# Patient Record
Sex: MALE | Race: OTHER | HISPANIC OR LATINO | URBAN - METROPOLITAN AREA
[De-identification: names, ages, dates, MRNs, and addresses within clinical notes are randomized per-mention and may not be internally consistent; named-entity substitution may affect disease eponyms.]

---

## 2022-06-11 ENCOUNTER — EMERGENCY (EMERGENCY)
Facility: HOSPITAL | Age: 47
LOS: 1 days | Discharge: HOME | End: 2022-06-11
Attending: EMERGENCY MEDICINE | Admitting: EMERGENCY MEDICINE
Payer: COMMERCIAL

## 2022-06-11 VITALS
TEMPERATURE: 98 F | SYSTOLIC BLOOD PRESSURE: 153 MMHG | OXYGEN SATURATION: 100 % | DIASTOLIC BLOOD PRESSURE: 87 MMHG | RESPIRATION RATE: 18 BRPM | HEART RATE: 81 BPM

## 2022-06-11 LAB
ALBUMIN SERPL ELPH-MCNC: 4.4 G/DL — SIGNIFICANT CHANGE UP (ref 3.3–5)
ALP SERPL-CCNC: 73 U/L — SIGNIFICANT CHANGE UP (ref 40–120)
ALT FLD-CCNC: 40 U/L — SIGNIFICANT CHANGE UP (ref 4–41)
ANION GAP SERPL CALC-SCNC: 10 MMOL/L — SIGNIFICANT CHANGE UP (ref 7–14)
AST SERPL-CCNC: 28 U/L — SIGNIFICANT CHANGE UP (ref 4–40)
BASOPHILS # BLD AUTO: 0.02 K/UL — SIGNIFICANT CHANGE UP (ref 0–0.2)
BASOPHILS NFR BLD AUTO: 0.3 % — SIGNIFICANT CHANGE UP (ref 0–2)
BILIRUB SERPL-MCNC: 0.4 MG/DL — SIGNIFICANT CHANGE UP (ref 0.2–1.2)
BUN SERPL-MCNC: 20 MG/DL — SIGNIFICANT CHANGE UP (ref 7–23)
CALCIUM SERPL-MCNC: 9.3 MG/DL — SIGNIFICANT CHANGE UP (ref 8.4–10.5)
CHLORIDE SERPL-SCNC: 107 MMOL/L — SIGNIFICANT CHANGE UP (ref 98–107)
CO2 SERPL-SCNC: 24 MMOL/L — SIGNIFICANT CHANGE UP (ref 22–31)
CREAT SERPL-MCNC: 1.13 MG/DL — SIGNIFICANT CHANGE UP (ref 0.5–1.3)
EGFR: 81 ML/MIN/1.73M2 — SIGNIFICANT CHANGE UP
EOSINOPHIL # BLD AUTO: 0.04 K/UL — SIGNIFICANT CHANGE UP (ref 0–0.5)
EOSINOPHIL NFR BLD AUTO: 0.6 % — SIGNIFICANT CHANGE UP (ref 0–6)
GLUCOSE SERPL-MCNC: 96 MG/DL — SIGNIFICANT CHANGE UP (ref 70–99)
HCT VFR BLD CALC: 47.8 % — SIGNIFICANT CHANGE UP (ref 39–50)
HGB BLD-MCNC: 15.8 G/DL — SIGNIFICANT CHANGE UP (ref 13–17)
IANC: 4.51 K/UL — SIGNIFICANT CHANGE UP (ref 1.8–7.4)
IMM GRANULOCYTES NFR BLD AUTO: 0.1 % — SIGNIFICANT CHANGE UP (ref 0–1.5)
LYMPHOCYTES # BLD AUTO: 1.79 K/UL — SIGNIFICANT CHANGE UP (ref 1–3.3)
LYMPHOCYTES # BLD AUTO: 25.6 % — SIGNIFICANT CHANGE UP (ref 13–44)
MCHC RBC-ENTMCNC: 29.3 PG — SIGNIFICANT CHANGE UP (ref 27–34)
MCHC RBC-ENTMCNC: 33.1 GM/DL — SIGNIFICANT CHANGE UP (ref 32–36)
MCV RBC AUTO: 88.5 FL — SIGNIFICANT CHANGE UP (ref 80–100)
MONOCYTES # BLD AUTO: 0.62 K/UL — SIGNIFICANT CHANGE UP (ref 0–0.9)
MONOCYTES NFR BLD AUTO: 8.9 % — SIGNIFICANT CHANGE UP (ref 2–14)
NEUTROPHILS # BLD AUTO: 4.51 K/UL — SIGNIFICANT CHANGE UP (ref 1.8–7.4)
NEUTROPHILS NFR BLD AUTO: 64.5 % — SIGNIFICANT CHANGE UP (ref 43–77)
NRBC # BLD: 0 /100 WBCS — SIGNIFICANT CHANGE UP
NRBC # FLD: 0 K/UL — SIGNIFICANT CHANGE UP
OB PNL STL: POSITIVE
PLATELET # BLD AUTO: 254 K/UL — SIGNIFICANT CHANGE UP (ref 150–400)
POTASSIUM SERPL-MCNC: 4.2 MMOL/L — SIGNIFICANT CHANGE UP (ref 3.5–5.3)
POTASSIUM SERPL-SCNC: 4.2 MMOL/L — SIGNIFICANT CHANGE UP (ref 3.5–5.3)
PROT SERPL-MCNC: 7 G/DL — SIGNIFICANT CHANGE UP (ref 6–8.3)
RBC # BLD: 5.4 M/UL — SIGNIFICANT CHANGE UP (ref 4.2–5.8)
RBC # FLD: 12.7 % — SIGNIFICANT CHANGE UP (ref 10.3–14.5)
SODIUM SERPL-SCNC: 141 MMOL/L — SIGNIFICANT CHANGE UP (ref 135–145)
WBC # BLD: 6.99 K/UL — SIGNIFICANT CHANGE UP (ref 3.8–10.5)
WBC # FLD AUTO: 6.99 K/UL — SIGNIFICANT CHANGE UP (ref 3.8–10.5)

## 2022-06-11 PROCEDURE — 99284 EMERGENCY DEPT VISIT MOD MDM: CPT

## 2022-06-11 NOTE — ED PROVIDER NOTE - PATIENT PORTAL LINK FT
You can access the FollowMyHealth Patient Portal offered by [facility] by registering at the following website: [email or web address]. By joining [email or web address]’s FollowMyHealth portal, you will also be able to view your health information using other applications (apps) compatible with our system.

## 2022-06-11 NOTE — ED PROVIDER NOTE - OBJECTIVE STATEMENT
This is a 46 yr old M, no pertinent pmh with c/o rectal bleeding since the last 2 days. Reports significant blood in bm. Since them he feels weak. This is a 46 yr old M, no pertinent pmh with c/o rectal bleeding since the last 2 days. Reports significant blood in bm. Since them he feels weak.  Family hx of colon ca, father age 56 passed away. This is a 46 yr old M, no pertinent pmh with c/o rectal bleeding since the last 2 days. Reports significant blood in bm. Since them he feels weak. C/o blood in the semen, as per his pmd possible bph, denies sexual disfunction.   Family hx of colon ca, father age 56 passed away. Denies fever, chills, sob, abd pain and cramping, changes in bladder and bowel habits.

## 2022-06-11 NOTE — ED ADULT TRIAGE NOTE - CHIEF COMPLAINT QUOTE
Pt reporting to the ED for bright red rectal bleeding starting 2 days ago. Reports bleeding when having BM. No chest pain, SOB, light headed. No pmh

## 2022-06-11 NOTE — ED PROVIDER NOTE - NSFOLLOWUPINSTRUCTIONS_ED_ALL_ED_FT
Gastrointestinal Bleeding      Gastrointestinal (GI) bleeding is bleeding somewhere along the digestive tract, between the mouth and the anus. This tract includes the mouth, esophagus, stomach, small intestine, large intestine, and anus. The large intestine is often called the colon.    GI bleeding can be caused by various problems. The severity of these problems can range from mild to serious or even life-threatening. If you have GI bleeding, you may find blood in your stools (feces), you may have black stools, or you may vomit blood. If there is a lot of bleeding, you may need to stay in the hospital.      What are the causes?    This condition may be caused by:  •Inflammation, irritation, or swelling of the esophagus (esophagitis). The esophagus is part of the body that moves food from your mouth to your stomach.      •Swollen veins in the rectum (hemorrhoids).      •Areas of painful tearing in the anus that are often caused by passing hard stool (anal fissures).      •Pouches that form on the colon over time, with age, and may bleed a lot (diverticulosis).      •Inflammation (diverticulitis) in areas with diverticulosis. This can cause pain, fever, and bloody stools, although bleeding may be mild.      •Growths (polyps) or cancer. Colon cancer often starts out as precancerous polyps.      •Gastritis and ulcers. With these, bleeding may come from the upper GI tract, near the stomach.        What increases the risk?    You are more likely to develop this condition if you:  •Have an infection in your stomach from a type of bacteria called Helicobacter pylori.    •Take certain medicines, such as:  •NSAIDs.      •Aspirin.      •Selective serotonin reuptake inhibitors (SSRIs).      •Steroids.      •Antiplatelet or anticoagulant medicines.        •Smoke.      •Drink alcohol.        What are the signs or symptoms?    Common symptoms of this condition include:  •Bright red blood in your vomit, or vomit that looks like coffee grounds.    •Bloody, black, or tarry stools.  •Bleeding from the lower GI tract will usually cause red or maroon blood in the stools.      •Bleeding from the upper GI tract may cause black, tarry stools that are often stronger smelling than usual.      •In certain cases, if the bleeding is fast enough, the stools may be red.        •Pain or cramping in the abdomen.        How is this diagnosed?    This condition may be diagnosed based on:  •Your medical history and a physical exam.    •Various tests, such as:  •Blood tests.      •Stool tests.      •X-rays and other imaging tests.      •Esophagogastroduodenoscopy (EGD). In this test, a flexible, lighted tube is used to look at your esophagus, stomach, and small intestine.      •Colonoscopy. In this test, a flexible, lighted tube is used to look at your colon.          How is this treated?    Treatment for this condition depends on the cause of the bleeding. For example:  •For bleeding from the esophagus, stomach, small intestine, or colon, the health care provider doing your EGD or colonoscopy may be able to stop the bleeding as part of the procedure.      •Inflammation or infection of the colon can be treated with medicines.      •Certain rectal problems can be treated with creams, suppositories, or warm baths.      •Medicines may be given to reduce acid in your stomach.      •Surgery is sometimes needed.      •Blood transfusions are sometimes needed if a lot of blood has been lost.      If bleeding is mild, you may be allowed to go home. If there is a lot of bleeding, you will need to stay in the hospital for observation.      Follow these instructions at home:     •Take over-the-counter and prescription medicines only as told by your health care provider.      •Eat foods that are high in fiber, such as beans, whole grains, and fresh fruits and vegetables. This will help to keep your stools soft. Eating 1–3 prunes each day works well for many people.      •Drink enough fluid to keep your urine pale yellow.      •Keep all follow-up visits as told by your health care provider. This is important.        Contact a health care provider if:    •Your symptoms do not improve.        Get help right away if:    •Your bleeding does not stop.      •You feel light-headed or you faint.      •You feel weak.      •You have severe cramps in your back or abdomen.      •You pass large blood clots in your stool.      •Your symptoms are getting worse.      •You have chest pain or fast heartbeats.        Summary    •Gastrointestinal (GI) bleeding is bleeding somewhere along the digestive tract, between the mouth and anus. GI bleeding can be caused by various problems. The severity of these problems can range from mild to serious or even life-threatening.      •Treatment for this condition depends on the cause of the bleeding.      •Take over-the-counter and prescription medicines only as told by your health care provider.      •Keep all follow-up visits as told by your health care provider. This is important.      •Get help right away if your bleeding increases, your symptoms are getting worse, or you have new symptoms.      This information is not intended to replace advice given to you by your health care provider. Make sure you discuss any questions you have with your health care provider.

## 2022-06-11 NOTE — ED PROVIDER NOTE - ATTENDING APP SHARED VISIT CONTRIBUTION OF CARE
Dr. Coppola:  I performed a face to face bedside interview with patient regarding history of present illness, review of symptoms and past medical history. I completed an independent physical exam.  I have discussed patient's plan of care with NP.  I agree with note as stated above, having amended the EMR as needed to reflect my findings.   This includes HISTORY OF PRESENT ILLNESS, HIV, PAST MEDICAL/SURGICAL/FAMILY/SOCIAL HISTORY, ALLERGIES AND HOME MEDICATIONS, REVIEW OF SYSTEMS, PHYSICAL EXAM, and any PROGRESS NOTES during the time I functioned as the attending physician for this patient.    46M denies pmh presents with 2 days of BRPBR.  Notes streaks of blood mixed with regular stool.  Denies fever/chills, cp ,sob, n/v/d, abd pain.  Father had colon cancer in his 50's.      Exam:  - nad  - rrr  - ctab   -abd soft ntnd  - rectal exam as per NP    A/P  - BRBPR, likely hemorrhoid but given family h/o, would recommend follow up with gastroenterologist for colonoscopy

## 2022-06-11 NOTE — ED PROVIDER NOTE - CLINICAL SUMMARY MEDICAL DECISION MAKING FREE TEXT BOX
This is a 46 yr old M, no pertinent pmh with c/o rectal bleeding since the last 2 days. Reports significant blood in bm. Since them he feels weak. C/o blood in the semen, as per his pmd possible bph, denies sexual disfunction.   Family hx of colon ca, father age 56 passed away. Denies fever, chills, sob, abd pain and cramping, changes in bladder and bowel habits.  labs- wnl  guaiac - +   gastro and  gu follow up

## 2023-12-30 NOTE — ED PROVIDER NOTE - NS ED ATTENDING STATEMENT MOD
Pt and/or family deny any needs or concerns at this time.   This was a shared visit with the JENSEN. I reviewed and verified the documentation and independently performed the documented: